# Patient Record
Sex: FEMALE | Race: WHITE
[De-identification: names, ages, dates, MRNs, and addresses within clinical notes are randomized per-mention and may not be internally consistent; named-entity substitution may affect disease eponyms.]

---

## 2018-04-16 ENCOUNTER — HOSPITAL ENCOUNTER (OUTPATIENT)
Dept: HOSPITAL 53 - M SDC | Age: 33
Discharge: HOME | End: 2018-04-16
Attending: SURGERY
Payer: COMMERCIAL

## 2018-04-16 DIAGNOSIS — J45.909: ICD-10-CM

## 2018-04-16 DIAGNOSIS — Z79.899: ICD-10-CM

## 2018-04-16 DIAGNOSIS — Z79.51: ICD-10-CM

## 2018-04-16 DIAGNOSIS — Z88.8: ICD-10-CM

## 2018-04-16 DIAGNOSIS — K43.6: ICD-10-CM

## 2018-04-16 DIAGNOSIS — K42.0: Primary | ICD-10-CM

## 2018-04-16 DIAGNOSIS — Z88.0: ICD-10-CM

## 2018-04-16 LAB
CONTROL LINE UCG: (no result)
URINE PREG TEST: NEGATIVE

## 2018-04-16 PROCEDURE — 49653: CPT

## 2018-04-16 RX ADMIN — SODIUM CHLORIDE, POTASSIUM CHLORIDE, SODIUM LACTATE AND CALCIUM CHLORIDE 1 MLS/HR: 600; 310; 30; 20 INJECTION, SOLUTION INTRAVENOUS at 06:50

## 2018-04-16 RX ADMIN — CLINDAMYCIN PHOSPHATE 1 MLS/HR: 600 INJECTION, SOLUTION INTRAVENOUS at 07:41

## 2018-04-16 RX ADMIN — HYDROCODONE BITARTRATE AND ACETAMINOPHEN 1 TAB: 5; 325 TABLET ORAL at 10:03

## 2018-04-16 RX ADMIN — BUPIVACAINE HYDROCHLORIDE AND EPINEPHRINE BITARTRATE 1 ML: 5; .005 INJECTION, SOLUTION EPIDURAL; INTRACAUDAL; PERINEURAL at 08:40

## 2018-04-16 RX ADMIN — ONDANSETRON 1 MG: 2 INJECTION INTRAMUSCULAR; INTRAVENOUS at 09:10

## 2019-09-03 ENCOUNTER — HOSPITAL ENCOUNTER (OUTPATIENT)
Dept: HOSPITAL 53 - M LDO | Age: 34
Discharge: HOME | End: 2019-09-03
Attending: OBSTETRICS & GYNECOLOGY
Payer: COMMERCIAL

## 2019-09-03 VITALS — DIASTOLIC BLOOD PRESSURE: 65 MMHG | SYSTOLIC BLOOD PRESSURE: 111 MMHG

## 2019-09-03 VITALS — DIASTOLIC BLOOD PRESSURE: 79 MMHG | SYSTOLIC BLOOD PRESSURE: 128 MMHG

## 2019-09-03 VITALS — HEIGHT: 66 IN | BODY MASS INDEX: 28.17 KG/M2 | WEIGHT: 175.27 LBS

## 2019-09-03 DIAGNOSIS — O36.8930: Primary | ICD-10-CM

## 2019-09-03 DIAGNOSIS — Z3A.38: ICD-10-CM

## 2019-09-03 PROCEDURE — 76815 OB US LIMITED FETUS(S): CPT

## 2019-09-03 PROCEDURE — 59025 FETAL NON-STRESS TEST: CPT

## 2019-09-03 NOTE — HPE
DATE OF ADMISSION:

 

This lady is a 34-year-old  4, para 2, abortio 2 at 38 and 1 weeks of

gestation was seen in the office and had a fetal bradycardia of 100 beats per

minute and the nurse practitioner midwife elected to have her observed in labor

and delivery.  Last period December 10, 2018, estimated date of delivery (EDC)

2019 at 38 and 1 with a history of fetal bradycardia in the office.  Risk

factors is she was late transfer of care at 36 weeks.  She had 1-hour elevated

GTT, her 3-hour was normal and she has reactive airway disease.

 

PAST HISTORY

In  at 10 weeks spontaneous 

 at 41 weeks induction of labor, spontaneous vaginal delivery, female 8

pounds 1 ounce.

2016 at 41 weeks induction of labor, spontaneous vaginal delivery, female 9

pounds 4 ounces.

 

LABS:

Are O+, HIV negative.  RPR negative, rubella immune.  Varicella immune.  Pap

negative.  Gonorrhea and chlamydia negative.  1-hour glucose was 134.  Her 3-hour

GTT fasting was 72, 1-hour was 125, 2-hours 113, 3-hour was 62.  GBS status is

pending.

 

PHYSICAL EXAMINATION:

No acute distress.  Symphysis fundus height is 38, vertex occiput anterior (OA).

Nontender uterus.  Fetal heart is 140.  Presently has a category one strip with

baseline variability 6-10 beats per minute.  No contractions.  Amniotic fluid

index (ANU) on ultrasound three quadrants vertical pocket 11.14. Limb movement

was noted and spontaneous respirations.  Blood pressure 111/65, respirations are

18, pulse 114, temperature 98.6.

 

SUMMARY

We have a term gestation with possibly maternal or fetal heart rate of 100, not

demonstrated here as anything below normal baseline.  Our plan of management is

to discharge the patient with precautions and to keep her appointment on

September 10, 2019.  The patient was discharged undelivered.